# Patient Record
Sex: FEMALE | Race: WHITE | NOT HISPANIC OR LATINO | Employment: FULL TIME | URBAN - METROPOLITAN AREA
[De-identification: names, ages, dates, MRNs, and addresses within clinical notes are randomized per-mention and may not be internally consistent; named-entity substitution may affect disease eponyms.]

---

## 2018-08-17 ENCOUNTER — OFFICE VISIT CONVERTED (OUTPATIENT)
Dept: GASTROENTEROLOGY | Facility: CLINIC | Age: 33
End: 2018-08-17
Attending: PHYSICIAN ASSISTANT

## 2018-12-13 ENCOUNTER — OFFICE VISIT CONVERTED (OUTPATIENT)
Dept: GASTROENTEROLOGY | Facility: CLINIC | Age: 33
End: 2018-12-13
Attending: INTERNAL MEDICINE

## 2019-02-18 ENCOUNTER — CONVERSION ENCOUNTER (OUTPATIENT)
Dept: FAMILY MEDICINE CLINIC | Facility: CLINIC | Age: 34
End: 2019-02-18

## 2019-02-18 ENCOUNTER — OFFICE VISIT CONVERTED (OUTPATIENT)
Dept: FAMILY MEDICINE CLINIC | Facility: CLINIC | Age: 34
End: 2019-02-18
Attending: FAMILY MEDICINE

## 2019-02-18 ENCOUNTER — HOSPITAL ENCOUNTER (OUTPATIENT)
Dept: FAMILY MEDICINE CLINIC | Facility: CLINIC | Age: 34
Discharge: HOME | End: 2019-02-18
Attending: FAMILY MEDICINE

## 2019-02-18 LAB — TSH SERPL-ACNC: 1.01 M[IU]/L (ref 0.27–4.2)

## 2019-04-12 ENCOUNTER — HOSPITAL ENCOUNTER (OUTPATIENT)
Dept: OTHER | Facility: HOSPITAL | Age: 34
Discharge: HOME | End: 2019-04-12

## 2019-04-15 ENCOUNTER — OFFICE VISIT CONVERTED (OUTPATIENT)
Dept: UROLOGY | Facility: CLINIC | Age: 34
End: 2019-04-15
Attending: UROLOGY

## 2019-04-15 ENCOUNTER — CONVERSION ENCOUNTER (OUTPATIENT)
Dept: SURGERY | Facility: CLINIC | Age: 34
End: 2019-04-15

## 2019-08-06 ENCOUNTER — HOSPITAL ENCOUNTER (OUTPATIENT)
Dept: SURGERY | Facility: CLINIC | Age: 34
Discharge: HOME | End: 2019-08-06
Attending: PHYSICIAN ASSISTANT

## 2019-08-06 ENCOUNTER — OFFICE VISIT CONVERTED (OUTPATIENT)
Dept: SURGERY | Facility: CLINIC | Age: 34
End: 2019-08-06
Attending: PHYSICIAN ASSISTANT

## 2019-08-08 LAB — BACTERIA UR CULT: NORMAL

## 2019-08-23 ENCOUNTER — HOSPITAL ENCOUNTER (OUTPATIENT)
Dept: GENERAL RADIOLOGY | Facility: HOSPITAL | Age: 34
Discharge: HOME | End: 2019-08-23
Attending: OBSTETRICS & GYNECOLOGY

## 2020-09-14 ENCOUNTER — HOSPITAL ENCOUNTER (OUTPATIENT)
Dept: LAB | Facility: HOSPITAL | Age: 35
Discharge: HOME | End: 2020-09-14
Attending: INTERNAL MEDICINE

## 2020-09-15 LAB
CREAT 24H UR-MCNC: 73.9 MG/DL
CREAT CL 24H UR+SERPL-VRATE: 79 ML/MIN (ref 61–166)
CREAT UR-MCNC: 0.86 MG/DL (ref 0.5–0.9)
CREAT UR-MCNC: 0.96 MG/DL (ref 0.5–0.9)
PROT 24H UR-MRATE: 4.6 MG/DL (ref 0–12)
PROT 24H UR-MRATE: 57.5 MG/(24.H) (ref 42–225)
SPECIMEN VOL 24H UR: 1250 ML
SPECIMEN VOL 24H UR: 1250 ML

## 2020-09-17 LAB
CREAT 24H UR-MCNC: 0.3 MG/DL
CREAT 24H UR-MRATE: 4 MG/24 HR (ref 0–80)

## 2020-10-14 ENCOUNTER — CONVERSION ENCOUNTER (OUTPATIENT)
Dept: SURGERY | Facility: CLINIC | Age: 35
End: 2020-10-14

## 2020-10-14 ENCOUNTER — OFFICE VISIT CONVERTED (OUTPATIENT)
Dept: UROLOGY | Facility: CLINIC | Age: 35
End: 2020-10-14
Attending: UROLOGY

## 2021-05-13 NOTE — PROGRESS NOTES
Progress Note      Patient Name: Roula Riggs   Patient ID: [id number]   Sex: Female   YOB: 1985    Primary Care Provider: Donny Davidson III MD    Visit Date: October 14, 2020    Provider: Sandy Reno MD   Location: [facility]   Location Address: [address] [place] [address]  [id number]   Location Phone: [phone]          History Of Present Illness  The patient returns for a routine follow-up after a previous visit on 08/06/2019, for the treatment of symptoms consistent with cystitis after sexual activity   The patient also complains of pain. The pain is maximal in the suprapubic region and is described as aching and pressure-like in quality. It does not radiate. The aggravating factors are sexual activity when she is not able to urinate after intercourse and the alleviating factors are none reported.   She has not been evaluated for her current complaints. The patient has a history of recurrent urinary tract infections. She has had one or two infections since she saw us last year.      She typically has four to six infections a year and almost all are after she is unable to urinate after intercourse.    Per patient if she doesn't take the medication every time after intercourse she will be with symptoms of urgency, bladder spasms, difficulty voiding and pain in suprapubic region that last for a few days.  She was worried about taking the medication every time.  But if she doesn't the symptoms are pretty significant and bothersome.       The Macrobid has helped and she is happy with it.        She is considering donating a kidney to a friend.       Past Medical History  Migraine Headaches         Past Surgical History  Chemodenervation, for migraine; EGD         Medication List  Aimovig Autoinjector 70 mg/mL subcutaneous auto-injector; Imitrex 6 mg/0.5 mL subcutaneous solution; Macrobid 100 mg oral capsule; Miralax 17 gram/dose oral powder; Zomig 2.5 mg nasal  "spray,non-aerosol         Allergy List  NO KNOWN DRUG ALLERGIES       Allergies Reconciled  Family Medical History  - No Family History of Colorectal Cancer         Reproductive History   0 Para 0 0 0 0       Social History  Alcohol (Never); Tobacco (Never)         Immunizations  Name Date Admin   Hepatitis A 2019         Review of Systems  · Constitutional  o Denies  o : chills, fever  · Gastrointestinal  o Denies  o : nausea, vomiting      Vitals  Date Time BP Position Site L\R Cuff Size HR RR TEMP (F) WT  HT  BMI kg/m2 BSA m2 O2 Sat FR L/min FiO2        10/14/2020 04:30 /60 Sitting       106lbs 2oz 5'  3\" 18.8 1.46             Physical Examination  · Constitutional  o Appearance  o : Well nourished, well developed patient in no acute distress. Ambulating without difficulty.  · Respiratory  o Respiratory Effort  o : Breathing is unlabored without accessory muscle use  · Gastrointestinal  o Abdominal Examination  o : Scaphoid abdomen which is non-tender to palpation with normal tone and without rigidity or guarding. Normal bowel sounds. No masses present.  · Skin and Subcutaneous Tissue  o General Inspection  o : No rashes, lesions or areas of discoloration present. Skin turgor is normal.  o General Palpation  o : No abnormalities, masses or tenderness on palpation.          Results  · In-Office Procedures  o Lab procedure  § Automated dipstick urinalysis with microscopy ([id number])   § Color Ur: Yellow   § Clarity Ur: Clear   § Glucose Ur Ql Strip: Negative   § Bilirub Ur Ql Strip: Negative   § Ketones Ur Ql Strip: Negative   § Sp Gr Ur Qn: 1.020   § Hgb Ur Ql Strip: Trace-intact   § pH Ur-LsCnc: 5.5   § Prot Ur Ql Strip: Negative   § Urobilinogen Ur Strip-mCnc: 0.2   § Nitrite Ur Ql Strip: Negative   § WBC Est Ur Ql Strip: Negative   § RBC UrnS Qn HPF: 0   § WBC UrnS Qn HPF: 0   § Bacteria UrnS Qn HPF: 0   § Crystals UrnS Qn HPF: 0   § Epithelial Cells (non renal): 0 /HPF  § Epithelial Cells " (renal): 0       Assessment  · Recurrent UTI (urinary tract infection)     599.0/N39.0  · Cystitis     595.9/N30.90      Plan  · Orders  o Urine Culture (Clean Catch) [facility] ([id number], [id number]) - 595.9/N30.90 - 10/14/2020  · Medications  o Macrobid 100 mg oral capsule   SIG: take 1 capsule (100 mg) by oral route prn as directed   DISP: (30) Capsule with 11 refills  Refilled on 10/14/2020     o Medications have been Reconciled  o Transition of Care or Provider Policy  · Instructions  o Macrobid was refilled. She can use when with sexual activity. IF she feels symptoms are getting worse despite taking medication, I have recommended to contact office for a urine culture. Patient will return as needed and one year.   o Electronically Identified Patient Education Materials Provided Electronically            Electronically Signed by: Sandy Reno MD -Author on October 14, 2020 05:07:29 PM

## 2021-05-14 VITALS
WEIGHT: 106.12 LBS | HEIGHT: 63 IN | SYSTOLIC BLOOD PRESSURE: 105 MMHG | BODY MASS INDEX: 18.8 KG/M2 | DIASTOLIC BLOOD PRESSURE: 60 MMHG

## 2021-05-15 VITALS — HEIGHT: 63 IN | BODY MASS INDEX: 18.8 KG/M2 | HEART RATE: 73 BPM | WEIGHT: 106.12 LBS

## 2021-05-15 VITALS — WEIGHT: 103 LBS | RESPIRATION RATE: 15 BRPM | BODY MASS INDEX: 18.25 KG/M2 | HEIGHT: 63 IN

## 2021-05-16 VITALS
HEART RATE: 116 BPM | HEIGHT: 62 IN | BODY MASS INDEX: 18.95 KG/M2 | TEMPERATURE: 98.9 F | SYSTOLIC BLOOD PRESSURE: 98 MMHG | DIASTOLIC BLOOD PRESSURE: 60 MMHG | WEIGHT: 103 LBS | OXYGEN SATURATION: 99 %

## 2021-05-16 VITALS
BODY MASS INDEX: 19.57 KG/M2 | WEIGHT: 106.37 LBS | DIASTOLIC BLOOD PRESSURE: 83 MMHG | RESPIRATION RATE: 12 BRPM | HEART RATE: 106 BPM | SYSTOLIC BLOOD PRESSURE: 116 MMHG | HEIGHT: 62 IN | OXYGEN SATURATION: 100 %

## 2021-05-16 VITALS
HEIGHT: 62 IN | RESPIRATION RATE: 12 BRPM | DIASTOLIC BLOOD PRESSURE: 67 MMHG | BODY MASS INDEX: 20.46 KG/M2 | HEART RATE: 90 BPM | OXYGEN SATURATION: 100 % | SYSTOLIC BLOOD PRESSURE: 110 MMHG | WEIGHT: 111.19 LBS

## 2021-12-21 ENCOUNTER — LAB (OUTPATIENT)
Dept: LAB | Facility: HOSPITAL | Age: 36
End: 2021-12-21

## 2021-12-21 ENCOUNTER — TRANSCRIBE ORDERS (OUTPATIENT)
Dept: LAB | Facility: HOSPITAL | Age: 36
End: 2021-12-21

## 2021-12-21 DIAGNOSIS — Z52.4 KIDNEY DONOR: ICD-10-CM

## 2021-12-21 DIAGNOSIS — Z52.4 KIDNEY DONOR: Primary | ICD-10-CM

## 2021-12-21 LAB
BACTERIA UR QL AUTO: ABNORMAL /HPF
BILIRUB UR QL STRIP: NEGATIVE
CLARITY UR: CLEAR
COLOR UR: YELLOW
CREAT SERPL-MCNC: 1.13 MG/DL (ref 0.57–1)
GFR SERPL CREATININE-BSD FRML MDRD: 55 ML/MIN/1.73
GLUCOSE UR STRIP-MCNC: NEGATIVE MG/DL
HGB UR QL STRIP.AUTO: ABNORMAL
HYALINE CASTS UR QL AUTO: ABNORMAL /LPF
KETONES UR QL STRIP: ABNORMAL
LEUKOCYTE ESTERASE UR QL STRIP.AUTO: NEGATIVE
NITRITE UR QL STRIP: NEGATIVE
PH UR STRIP.AUTO: 5.5 [PH] (ref 5–8)
PROT UR QL STRIP: NEGATIVE
RBC # UR STRIP: ABNORMAL /HPF
REF LAB TEST METHOD: ABNORMAL
SP GR UR STRIP: 1.03 (ref 1–1.03)
SQUAMOUS #/AREA URNS HPF: ABNORMAL /HPF
UROBILINOGEN UR QL STRIP: ABNORMAL
WBC # UR STRIP: ABNORMAL /HPF

## 2021-12-21 PROCEDURE — 36415 COLL VENOUS BLD VENIPUNCTURE: CPT

## 2021-12-21 PROCEDURE — 82565 ASSAY OF CREATININE: CPT

## 2021-12-21 PROCEDURE — 81001 URINALYSIS AUTO W/SCOPE: CPT

## 2022-01-07 DIAGNOSIS — N30.00 ACUTE CYSTITIS WITHOUT HEMATURIA: Primary | ICD-10-CM

## 2022-01-11 RX ORDER — NITROFURANTOIN 25; 75 MG/1; MG/1
CAPSULE ORAL
Qty: 14 CAPSULE | Refills: 11 | Status: SHIPPED | OUTPATIENT
Start: 2022-01-11 | End: 2023-03-27 | Stop reason: SDUPTHER

## 2023-01-11 ENCOUNTER — TRANSCRIBE ORDERS (OUTPATIENT)
Dept: LAB | Facility: HOSPITAL | Age: 38
End: 2023-01-11
Payer: COMMERCIAL

## 2023-01-11 ENCOUNTER — LAB (OUTPATIENT)
Dept: LAB | Facility: HOSPITAL | Age: 38
End: 2023-01-11
Payer: COMMERCIAL

## 2023-01-11 DIAGNOSIS — Z52.4 KIDNEY DONOR: Primary | ICD-10-CM

## 2023-01-11 DIAGNOSIS — Z52.4 KIDNEY DONOR: ICD-10-CM

## 2023-01-11 LAB
ANION GAP SERPL CALCULATED.3IONS-SCNC: 11 MMOL/L (ref 5–15)
BILIRUB UR QL STRIP: NEGATIVE
BUN SERPL-MCNC: 12 MG/DL (ref 6–20)
BUN/CREAT SERPL: 10.7 (ref 7–25)
CALCIUM SPEC-SCNC: 9.9 MG/DL (ref 8.6–10.5)
CHLORIDE SERPL-SCNC: 101 MMOL/L (ref 98–107)
CLARITY UR: CLEAR
CO2 SERPL-SCNC: 24 MMOL/L (ref 22–29)
COLOR UR: YELLOW
CREAT SERPL-MCNC: 1.12 MG/DL (ref 0.57–1)
EGFRCR SERPLBLD CKD-EPI 2021: 65.5 ML/MIN/1.73
GLUCOSE SERPL-MCNC: 92 MG/DL (ref 65–99)
GLUCOSE UR STRIP-MCNC: NEGATIVE MG/DL
HGB UR QL STRIP.AUTO: NEGATIVE
KETONES UR QL STRIP: NEGATIVE
LEUKOCYTE ESTERASE UR QL STRIP.AUTO: NEGATIVE
NITRITE UR QL STRIP: NEGATIVE
PH UR STRIP.AUTO: 6.5 [PH] (ref 5–8)
POTASSIUM SERPL-SCNC: 3.8 MMOL/L (ref 3.5–5.2)
PROT UR QL STRIP: NEGATIVE
SODIUM SERPL-SCNC: 136 MMOL/L (ref 136–145)
SP GR UR STRIP: <=1.005 (ref 1–1.03)
UROBILINOGEN UR QL STRIP: NORMAL

## 2023-01-11 PROCEDURE — 80048 BASIC METABOLIC PNL TOTAL CA: CPT

## 2023-01-11 PROCEDURE — 36415 COLL VENOUS BLD VENIPUNCTURE: CPT

## 2023-01-11 PROCEDURE — 81003 URINALYSIS AUTO W/O SCOPE: CPT

## 2023-03-27 DIAGNOSIS — N30.00 ACUTE CYSTITIS WITHOUT HEMATURIA: ICD-10-CM

## 2023-03-27 RX ORDER — NITROFURANTOIN 25; 75 MG/1; MG/1
CAPSULE ORAL
Qty: 14 CAPSULE | Refills: 3 | Status: SHIPPED | OUTPATIENT
Start: 2023-03-27

## 2023-06-09 ENCOUNTER — OFFICE VISIT (OUTPATIENT)
Dept: UROLOGY | Facility: CLINIC | Age: 38
End: 2023-06-09
Payer: MEDICAID

## 2023-06-09 VITALS — WEIGHT: 102 LBS | HEIGHT: 63 IN | BODY MASS INDEX: 18.07 KG/M2

## 2023-06-09 DIAGNOSIS — N30.00 ACUTE CYSTITIS WITHOUT HEMATURIA: Primary | ICD-10-CM

## 2023-06-09 LAB
BILIRUB BLD-MCNC: NEGATIVE MG/DL
CLARITY, POC: CLEAR
COLOR UR: YELLOW
EXPIRATION DATE: 724
GLUCOSE UR STRIP-MCNC: NEGATIVE MG/DL
KETONES UR QL: ABNORMAL
LEUKOCYTE EST, POC: NEGATIVE
Lab: ABNORMAL
NITRITE UR-MCNC: NEGATIVE MG/ML
PH UR: 6.5 [PH] (ref 5–8)
PROT UR STRIP-MCNC: NEGATIVE MG/DL
RBC # UR STRIP: ABNORMAL /UL
SP GR UR: 1.02 (ref 1–1.03)
UROBILINOGEN UR QL: ABNORMAL

## 2023-06-09 RX ORDER — NITROFURANTOIN 25; 75 MG/1; MG/1
CAPSULE ORAL
Qty: 14 CAPSULE | Refills: 3 | Status: SHIPPED | OUTPATIENT
Start: 2023-06-09

## 2023-06-09 NOTE — PROGRESS NOTES
"Chief Complaint  Acute Cystitis    Subjective          Roula Riggs presents to [facility]  History of Present Illness   The patient presents today for a follow-up.    The patient is doing well as long as she takes Macrobid after intercourse. She denies any infections over the last year or 2. The patient reports she has irritation. Her pH is not very acidic.    A review of systems was completed and positive findings are noted in the HPI.    Objective   Vital Signs:   Ht 160 cm (63\")   Wt 46.3 kg (102 lb)   BMI 18.07 kg/m²       Physical Exam  Vitals and nursing note reviewed.   Constitutional:       Appearance: Normal appearance. She is well-developed.   Pulmonary:      Effort: Pulmonary effort is normal.      Breath sounds: Normal air entry.   Neurological:      Mental Status: She is alert and oriented to person, place, and time.      Motor: Motor function is intact.   Psychiatric:         Mood and Affect: Mood normal.         Behavior: Behavior normal.        Result Review :   The following data was reviewed by: Sandy Reno MD on 06/09/2023:    Results for orders placed or performed in visit on 06/09/23   POC Urinalysis Dipstick, Automated    Specimen: Urine   Result Value Ref Range    Color Yellow Yellow, Straw, Dark Yellow, Camille    Clarity, UA Clear Clear    Specific Gravity  1.020 1.005 - 1.030    pH, Urine 6.5 5.0 - 8.0    Leukocytes Negative Negative    Nitrite, UA Negative Negative    Protein, POC Negative Negative mg/dL    Glucose, UA Negative Negative mg/dL    Ketones, UA Trace (A) Negative    Urobilinogen, UA 0.2 E.U./dL Normal, 0.2 E.U./dL    Bilirubin Negative Negative    Blood, UA Trace (A) Negative    Lot Number [id number]     Expiration Date 724               Assessment and Plan    Diagnoses and all orders for this visit:    1. Acute cystitis without hematuria (Primary)  -     POC Urinalysis Dipstick, Automated  -     nitrofurantoin, " macrocrystal-monohydrate, (MACROBID) 100 MG capsule; Take 1 capsule by mouth as needed as directed  Dispense: 14 capsule; Refill: 3    - Refilled her Macrobid to take after intercourse.  - We will see her back in 1 year.        Follow Up     We will see her back in 1 year.    Patient was given instructions and counseling regarding her condition or for health maintenance advice. Please see specific information pulled into the AVS if appropriate.     Transcribed from ambient dictation for Sandy Reno MD by Anya Goetz.  06/09/23   12:53 EDT    Patient or patient representative verbalized consent to the visit recording.  I have personally performed the services described in this document as transcribed by the above individual, and it is both accurate and complete.

## 2023-11-08 NOTE — PROGRESS NOTES
GYN Visit    CC: AUB    HPI:   38 y.o. Contraception or HRT: Contraception:  Vasectomy   Menses:   q 30 days, lasts 6-7 days, changes products q 2 hrs on heaviest days.   Pain:  Mild, OTC meds control discomfort        Pt is c/o 6 months of increasingly heavy menstrual flow      History: PMHx, Meds, Allergies, PSHx, Social Hx, and POBHx all reviewed and updated.  Physical Exam   PHYSICAL EXAM:  /69   Pulse 94   Wt 46.7 kg (103 lb)   LMP 2023 (Exact Date)   Breastfeeding No   BMI 18.25 kg/m²   General- NAD, alert and oriented, appropriate  Psych- Normal mood, good memory    Neck- No masses, no thyroid enlargement  CV- Regular rhythm, no murnurs  Resp- CTA to bases, no wheezes  Abdomen- Soft, non distended, non tender, no masses    ASSESSMENT AND PLAN:  Diagnoses and all orders for this visit:    1. Abnormal uterine bleeding (AUB) (Primary)  Assessment & Plan:  C/o 6 months of icreasingly heavy menses  Passing clots, heavy flow for 2-3 days, flooding and having accidents at work. Changing double protection q 2 hours  Prior to this menses have always been regular    Orders:  -     CBC (No Diff)  -     Prolactin  -     TSH  -     T4, Free  -     US Pelvis Transvaginal Non OB; Future    2. Perimenopause  Assessment & Plan:  Patient is complaining of increasing anxiety and mood swings, significant brain fog, some hot flashes  She states her mother went through menopause in her mid 40s  Counseled patient regarding perimenopause as a clinical diagnosis and how abnormal uterine bleeding can also be seen in perimenopause  Discussed medical management of perimenopause.  We will continue to evaluate AUB          Counseling:  Perimenopause        Follow Up:  Return for post ultrasound.          Becca Persaud MD  2023

## 2023-11-09 ENCOUNTER — OFFICE VISIT (OUTPATIENT)
Dept: OBSTETRICS AND GYNECOLOGY | Facility: CLINIC | Age: 38
End: 2023-11-09
Payer: COMMERCIAL

## 2023-11-09 VITALS
WEIGHT: 103 LBS | BODY MASS INDEX: 18.25 KG/M2 | HEART RATE: 94 BPM | DIASTOLIC BLOOD PRESSURE: 69 MMHG | SYSTOLIC BLOOD PRESSURE: 100 MMHG

## 2023-11-09 DIAGNOSIS — N95.1 PERIMENOPAUSE: ICD-10-CM

## 2023-11-09 DIAGNOSIS — N93.9 ABNORMAL UTERINE BLEEDING (AUB): Primary | ICD-10-CM

## 2023-11-09 LAB
DEPRECATED RDW RBC AUTO: 41.7 FL (ref 37–54)
ERYTHROCYTE [DISTWIDTH] IN BLOOD BY AUTOMATED COUNT: 11.8 % (ref 12.3–15.4)
HCT VFR BLD AUTO: 35.3 % (ref 34–46.6)
HGB BLD-MCNC: 11.6 G/DL (ref 12–15.9)
MCH RBC QN AUTO: 31.9 PG (ref 26.6–33)
MCHC RBC AUTO-ENTMCNC: 32.9 G/DL (ref 31.5–35.7)
MCV RBC AUTO: 97 FL (ref 79–97)
PLATELET # BLD AUTO: 255 10*3/MM3 (ref 140–450)
PMV BLD AUTO: 11 FL (ref 6–12)
PROLACTIN SERPL-MCNC: 11.3 NG/ML (ref 4.79–23.3)
RBC # BLD AUTO: 3.64 10*6/MM3 (ref 3.77–5.28)
T4 FREE SERPL-MCNC: 1.1 NG/DL (ref 0.93–1.7)
TSH SERPL DL<=0.05 MIU/L-ACNC: 1.01 UIU/ML (ref 0.27–4.2)
WBC NRBC COR # BLD: 5.44 10*3/MM3 (ref 3.4–10.8)

## 2023-11-09 PROCEDURE — 85027 COMPLETE CBC AUTOMATED: CPT | Performed by: OBSTETRICS & GYNECOLOGY

## 2023-11-09 PROCEDURE — 84439 ASSAY OF FREE THYROXINE: CPT | Performed by: OBSTETRICS & GYNECOLOGY

## 2023-11-09 PROCEDURE — 84146 ASSAY OF PROLACTIN: CPT | Performed by: OBSTETRICS & GYNECOLOGY

## 2023-11-09 PROCEDURE — 84443 ASSAY THYROID STIM HORMONE: CPT | Performed by: OBSTETRICS & GYNECOLOGY

## 2023-11-09 RX ORDER — ERENUMAB-AOOE 140 MG/ML
INJECTION, SOLUTION SUBCUTANEOUS
COMMUNITY

## 2023-11-09 RX ORDER — RIMEGEPANT SULFATE 75 MG/75MG
TABLET, ORALLY DISINTEGRATING ORAL
COMMUNITY

## 2023-11-09 NOTE — ASSESSMENT & PLAN NOTE
C/o 6 months of icreasingly heavy menses  Passing clots, heavy flow for 2-3 days, flooding and having accidents at work. Changing double protection q 2 hours  Prior to this menses have always been regular

## 2023-11-09 NOTE — ASSESSMENT & PLAN NOTE
Patient is complaining of increasing anxiety and mood swings, significant brain fog, some hot flashes  She states her mother went through menopause in her mid 40s  Counseled patient regarding perimenopause as a clinical diagnosis and how abnormal uterine bleeding can also be seen in perimenopause  Discussed medical management of perimenopause.  We will continue to evaluate AUB

## 2023-11-09 NOTE — PATIENT INSTRUCTIONS
Venipuncture Blood Specimen Collection  Venipuncture performed in left arm by Mary Leon with good hemostasis. Patient tolerated the procedure well without complications.   11/09/23   Mary Leon

## 2023-12-12 ENCOUNTER — OFFICE VISIT (OUTPATIENT)
Dept: OBSTETRICS AND GYNECOLOGY | Facility: CLINIC | Age: 38
End: 2023-12-12
Payer: COMMERCIAL

## 2023-12-12 VITALS
DIASTOLIC BLOOD PRESSURE: 64 MMHG | SYSTOLIC BLOOD PRESSURE: 97 MMHG | BODY MASS INDEX: 18.42 KG/M2 | HEART RATE: 75 BPM | WEIGHT: 104 LBS

## 2023-12-12 DIAGNOSIS — Z30.430 ENCOUNTER FOR IUD INSERTION: Primary | ICD-10-CM

## 2023-12-12 DIAGNOSIS — Z30.430 ENCOUNTER FOR IUD INSERTION: ICD-10-CM

## 2024-01-16 NOTE — PROGRESS NOTES
Well Woman Visit    CC: Scheduled annual well gyn visit  Chief Complaint   Patient presents with    Annual Exam     IUD check       Myriad intake in the past?: No        Contraception:  Mirena IUD, Contraception:  Vasectomy     HPI:   38 y.o.     Menses:   q irregular days, lasts irregular days, changes products q irregular hrs on heaviest days.     Pain:  Mild, OTC meds control discomfort    PCP: does manage PMHx and preventative labs  History: PMHx, Meds, Allergies, PSHx, Social Hx, and POBHx all reviewed and updated.    Patient is having significant side effects with her Mirena IUD.  She has significant bilateral breast tenderness as well as cystic acne since the IUD was placed.  She has a Mirena IUD several years prior and did not have any of the symptoms with that IUD.    PHYSICAL EXAM:  /69   Pulse 73   Wt 47.2 kg (104 lb)   Breastfeeding No   BMI 18.42 kg/m²  Not found.  General- NAD, alert and oriented, appropriate  Psych- Normal mood, good memory  Neck- No masses, no thyroid enlargement  CV- Regular rhythm, no murnurs  Resp- CTA to bases, no wheezes  Abdomen- Soft, non distended, non tender, no masses    Breast left-  Bilaterally symmetrical, no masses, non tender, no nipple discharge  Breast right- Bilaterally symmetrical, no masses, non tender, no nipple discharge    External genitalia- Normal female, no lesions  Urethra/meatus- Normal, no masses, non tender  Bladder- Normal, no masses, non tender  Vagina- Normal, no atrophy, no lesions, no discharge.    Cvx- Normal, no lesions, no discharge, No cervical motion tenderness, IUD strings visable 2cm, IUD strings gently grasped and IUD was removed with gentle traction, intact and discarded.  Uterus- Normal size, shape & consistency.  Non tender, mobile.  Adnexa- No mass, non tender  Anus/Rectum/Perineum- Not performed    Lymphatic- No palpable neck, axillary, or groin nodes  Ext- No edema, no cyanosis    Skin- No lesions, no rashes, no  acanthosis nigricans      ASSESSMENT and PLAN:    Diagnoses and all orders for this visit:    1. Abnormal uterine bleeding (AUB) (Primary)  Assessment & Plan:  Patient had a Mirena IUD placed and is continuing to have persistent AUB.  Patient also is complaining of significant bilateral breast tenderness and cystic acne since IUD was placed.  Patient used an IUD several years earlier and did not have any of the side effects at that time.  Patient does not need contraception and was only using the Mirena IUD for AUB.  Counseled the patient regarding removal of the IUD and proceeding with hysteroscopy/D&C/endometrial ablation.  Patient was counseled at length regarding the risks and benefits of the above surgery.  All questions were answered and informed consent was obtained.  IUD strings were easily visualized grasped and the Mirena IUD was removed with gentle traction intact and discarded.    Orders:  -     Case Request; Standing  -     CBC and Differential; Future  -     Urinalysis With Culture If Indicated -; Future  -     sodium chloride 0.9 % flush 3 mL  -     sodium chloride 0.9 % flush 10 mL  -     sodium chloride 0.9 % infusion 40 mL  -     lactated ringers infusion  -     acetaminophen (TYLENOL) tablet 1,000 mg  -     ceFAZolin (ANCEF) 2,000 mg in sodium chloride 0.9 % 100 mL IVPB  -     Case Request  -     Urinalysis With Culture If Indicated - Urine, Clean Catch  -     [place] Urine Culture Tube - Urine, Clean Catch    2. Well woman exam with routine gynecological exam  -     IgP, Aptima HPV    3. Perimenopause    Other orders  -     Code Status and Medical Interventions:; Standing  -     Follow Anesthesia Guidelines / Protocol; Future  -     Follow Anesthesia Guidelines / Protocol; Standing  -     Obtain Informed Consent; Standing  -     Verify / Perform Chlorhexidine Skin Prep; Standing  -     Insert Peripheral IV; Standing  -     Saline Lock & Maintain IV Access; Standing        Preventative:  CERVICAL  CANCER Screening- Reviewed current ASCCP guidelines for screening w and wo cotest HPV, age specific.  Screen: Updated today  Follow up PCP/Specialist PMHx and/or Labs    Pt was counseled at length regarding the risks and benefits of surgery.  The risks include but are not limited to the risk of anesthesia, the risk of bleeding, the risk of infection, the risk of injury to the bowel, bladder, ureters and any surrounding structures.  Risks also include possibility of needing future surgery to correct an issue as a result of the first surgery.  All questions were answered and informed consent was obtained.   She understands the importance of having any ordered tests to be performed in a timely fashion.  The risks of not performing them include, but are not limited to, advanced cancer stages, bone loss from osteoporosis and/or subsequent increase in morbidity and/or mortality.  She is encouraged to review her results online and/or contact or office if she has questions.     Follow Up:  Return for EMB.            Becca Persaud MD  01/17/2024    [facility]  [facility] [place] [address]  Dept: [phone]  Dept Fax: [phone]  Loc: [phone]  Loc Fax: [phone]

## 2024-01-17 ENCOUNTER — OFFICE VISIT (OUTPATIENT)
Dept: OBSTETRICS AND GYNECOLOGY | Facility: CLINIC | Age: 39
End: 2024-01-17
Payer: COMMERCIAL

## 2024-01-17 VITALS
WEIGHT: 104 LBS | BODY MASS INDEX: 18.42 KG/M2 | DIASTOLIC BLOOD PRESSURE: 69 MMHG | SYSTOLIC BLOOD PRESSURE: 101 MMHG | HEART RATE: 73 BPM

## 2024-01-17 DIAGNOSIS — Z01.419 WELL WOMAN EXAM WITH ROUTINE GYNECOLOGICAL EXAM: ICD-10-CM

## 2024-01-17 DIAGNOSIS — N95.1 PERIMENOPAUSE: ICD-10-CM

## 2024-01-17 DIAGNOSIS — N93.9 ABNORMAL UTERINE BLEEDING (AUB): Primary | ICD-10-CM

## 2024-01-17 LAB
BACTERIA UR QL AUTO: ABNORMAL /HPF
BILIRUB UR QL STRIP: NEGATIVE
CLARITY UR: CLEAR
COLOR UR: YELLOW
GLUCOSE UR STRIP-MCNC: NEGATIVE MG/DL
HGB UR QL STRIP.AUTO: NEGATIVE
HOLD SPECIMEN: NORMAL
HYALINE CASTS UR QL AUTO: ABNORMAL /LPF
KETONES UR QL STRIP: ABNORMAL
LEUKOCYTE ESTERASE UR QL STRIP.AUTO: NEGATIVE
NITRITE UR QL STRIP: NEGATIVE
PH UR STRIP.AUTO: 5.5 [PH] (ref 5–8)
PROT UR QL STRIP: ABNORMAL
RBC # UR STRIP: ABNORMAL /HPF
REF LAB TEST METHOD: ABNORMAL
SP GR UR STRIP: >=1.03 (ref 1–1.03)
SQUAMOUS #/AREA URNS HPF: ABNORMAL /HPF
UROBILINOGEN UR QL STRIP: ABNORMAL
WBC # UR STRIP: ABNORMAL /HPF

## 2024-01-17 PROCEDURE — 81001 URINALYSIS AUTO W/SCOPE: CPT | Performed by: OBSTETRICS & GYNECOLOGY

## 2024-01-17 RX ORDER — SODIUM CHLORIDE 0.9 % (FLUSH) 0.9 %
10 SYRINGE (ML) INJECTION AS NEEDED
OUTPATIENT
Start: 2024-01-17

## 2024-01-17 RX ORDER — SODIUM CHLORIDE, SODIUM LACTATE, POTASSIUM CHLORIDE, CALCIUM CHLORIDE 600; 310; 30; 20 MG/100ML; MG/100ML; MG/100ML; MG/100ML
125 INJECTION, SOLUTION INTRAVENOUS CONTINUOUS
OUTPATIENT
Start: 2024-01-17

## 2024-01-17 RX ORDER — SODIUM CHLORIDE 9 MG/ML
40 INJECTION, SOLUTION INTRAVENOUS AS NEEDED
OUTPATIENT
Start: 2024-01-17

## 2024-01-17 RX ORDER — SODIUM CHLORIDE 0.9 % (FLUSH) 0.9 %
3 SYRINGE (ML) INJECTION EVERY 12 HOURS SCHEDULED
OUTPATIENT
Start: 2024-01-17

## 2024-01-17 RX ORDER — ZOLMITRIPTAN 5 MG/1
1 SPRAY NASAL
COMMUNITY
Start: 2024-01-04

## 2024-01-17 RX ORDER — ACETAMINOPHEN 500 MG
1000 TABLET ORAL ONCE
OUTPATIENT
Start: 2024-01-17 | End: 2024-01-17

## 2024-01-17 NOTE — ASSESSMENT & PLAN NOTE
Patient had a Mirena IUD placed and is continuing to have persistent AUB.  Patient also is complaining of significant bilateral breast tenderness and cystic acne since IUD was placed.  Patient used an IUD several years earlier and did not have any of the side effects at that time.  Patient does not need contraception and was only using the Mirena IUD for AUB.  Counseled the patient regarding removal of the IUD and proceeding with hysteroscopy/D&C/endometrial ablation.  Patient was counseled at length regarding the risks and benefits of the above surgery.  All questions were answered and informed consent was obtained.  IUD strings were easily visualized grasped and the Mirena IUD was removed with gentle traction intact and discarded.

## 2024-01-22 LAB
CYTOLOGIST CVX/VAG CYTO: NORMAL
CYTOLOGY CVX/VAG DOC CYTO: NORMAL
CYTOLOGY CVX/VAG DOC THIN PREP: NORMAL
DX ICD CODE: NORMAL
HIV 1 & 2 AB SER-IMP: NORMAL
HPV I/H RISK 4 DNA CVX QL PROBE+SIG AMP: NEGATIVE
OTHER STN SPEC: NORMAL
STAT OF ADQ CVX/VAG CYTO-IMP: NORMAL

## 2024-03-25 ENCOUNTER — TELEPHONE (OUTPATIENT)
Dept: OBSTETRICS AND GYNECOLOGY | Facility: CLINIC | Age: 39
End: 2024-03-25
Payer: COMMERCIAL

## 2024-05-06 DIAGNOSIS — N30.00 ACUTE CYSTITIS WITHOUT HEMATURIA: ICD-10-CM

## 2024-05-06 RX ORDER — NITROFURANTOIN 25; 75 MG/1; MG/1
100 CAPSULE ORAL AS NEEDED
Qty: 14 CAPSULE | Refills: 0 | Status: SHIPPED | OUTPATIENT
Start: 2024-05-06

## 2024-07-15 DIAGNOSIS — N30.00 ACUTE CYSTITIS WITHOUT HEMATURIA: ICD-10-CM

## 2024-07-15 RX ORDER — NITROFURANTOIN 25; 75 MG/1; MG/1
100 CAPSULE ORAL AS NEEDED
Qty: 14 CAPSULE | Refills: 0 | Status: SHIPPED | OUTPATIENT
Start: 2024-07-15

## 2024-10-16 ENCOUNTER — OFFICE VISIT (OUTPATIENT)
Dept: UROLOGY | Facility: CLINIC | Age: 39
End: 2024-10-16
Payer: COMMERCIAL

## 2024-10-16 VITALS
HEIGHT: 63 IN | WEIGHT: 104 LBS | DIASTOLIC BLOOD PRESSURE: 69 MMHG | SYSTOLIC BLOOD PRESSURE: 113 MMHG | BODY MASS INDEX: 18.43 KG/M2

## 2024-10-16 DIAGNOSIS — N30.00 ACUTE CYSTITIS WITHOUT HEMATURIA: Primary | ICD-10-CM

## 2024-10-16 LAB
BILIRUB BLD-MCNC: NEGATIVE MG/DL
CLARITY, POC: CLEAR
COLOR UR: YELLOW
EXPIRATION DATE: 825
GLUCOSE UR STRIP-MCNC: NEGATIVE MG/DL
KETONES UR QL: NEGATIVE
LEUKOCYTE EST, POC: ABNORMAL
Lab: ABNORMAL
NITRITE UR-MCNC: NEGATIVE MG/ML
PH UR: 6.5 [PH] (ref 5–8)
PROT UR STRIP-MCNC: NEGATIVE MG/DL
RBC # UR STRIP: ABNORMAL /UL
SP GR UR: 1.02 (ref 1–1.03)
UROBILINOGEN UR QL: ABNORMAL

## 2024-10-16 NOTE — PROGRESS NOTES
"Chief Complaint  Acute cystitis without hematuria    Subjective          Roula Riggs presents to [facility]    History of Present Illness  The patient presents today for a follow-up.     The patient is doing well as long as she takes Macrobid after intercourse. She denies any infections over the last year or 2. The patient reports she has irritation. Her pH is not very acidic.     A review of systems was completed and positive findings are noted in the HPI.      Objective   Vital Signs:   /69   Ht 160 cm (63\")   Wt 47.2 kg (104 lb)   BMI 18.42 kg/m²       Physical Exam  Vitals and nursing note reviewed.   Constitutional:       Appearance: Normal appearance. She is well-developed.   Pulmonary:      Effort: Pulmonary effort is normal.      Breath sounds: Normal air entry.   Neurological:      Mental Status: She is alert and oriented to person, place, and time.      Motor: Motor function is intact.   Psychiatric:         Mood and Affect: Mood normal.         Behavior: Behavior normal.          Result Review :   The following data was reviewed by: Sandy Reno MD on 10/16/2024:    Results for orders placed or performed in visit on 10/16/24   POC Urinalysis Dipstick, Automated    Collection Time: 10/16/24  2:43 PM    Specimen: Urine   Result Value Ref Range    Color Yellow Yellow, Straw, Dark Yellow, Camille    Clarity, UA Clear Clear    Specific Gravity  1.020 1.005 - 1.030    pH, Urine 6.5 5.0 - 8.0    Leukocytes Trace (A) Negative    Nitrite, UA Negative Negative    Protein, POC Negative Negative mg/dL    Glucose, UA Negative Negative mg/dL    Ketones, UA Negative Negative    Urobilinogen, UA 0.2 E.U./dL Normal, 0.2 E.U./dL    Bilirubin Negative Negative    Blood, UA Large (A) Negative    Lot Number [id number]     Expiration Date 825               Assessment and Plan    Diagnoses and all orders for this visit:    1. Acute cystitis without hematuria (Primary)  -     POC " Urinalysis Dipstick, Automated  -     nitrofurantoin, macrocrystal-monohydrate, (MACROBID) 100 MG capsule; Take 1 capsule by mouth As Needed (as directed).  Dispense: 14 capsule; Refill: 0    She will continue Macrobid prophylaxis after intercourse.  I will see her in 1 year or sooner if needed.          Follow Up       No follow-ups on file.  Patient was given instructions and counseling regarding her condition or for health maintenance advice. Please see specific information pulled into the AVS if appropriate.

## 2024-10-18 ENCOUNTER — TELEPHONE (OUTPATIENT)
Dept: UROLOGY | Facility: CLINIC | Age: 39
End: 2024-10-18
Payer: COMMERCIAL

## 2024-10-18 RX ORDER — NITROFURANTOIN 25; 75 MG/1; MG/1
100 CAPSULE ORAL AS NEEDED
Qty: 14 CAPSULE | Refills: 0 | Status: SHIPPED | OUTPATIENT
Start: 2024-10-18

## 2024-10-18 NOTE — TELEPHONE ENCOUNTER
PATIENT SAID SHE WAS SEEN WEDNESDAY, AND WAS SUPPOSED TO HAVE AN PRESCRIPTION FOR MACROBID SENT TO [facility], BUT IT WASN'T SENT.

## 2025-01-20 ENCOUNTER — TELEPHONE (OUTPATIENT)
Dept: OBSTETRICS AND GYNECOLOGY | Facility: CLINIC | Age: 40
End: 2025-01-20

## 2025-01-20 NOTE — TELEPHONE ENCOUNTER
Caller: Roula Riggs    Relationship:  Self    Best call back number: [phone]    PATIENT CALLED REQUESTING TO CANCEL SAME DAY APPT.    Did the patient call AFTER the start time of their scheduled appointment?  []YES  [x]NO    Was the patient's appointment rescheduled? [x]YES  []NO    Any additional information: PT STARTED HER CYCLE AND HAD TO RESCHEDULE TODAY'S APPT    .

## 2025-02-05 ENCOUNTER — OFFICE VISIT (OUTPATIENT)
Dept: OBSTETRICS AND GYNECOLOGY | Facility: CLINIC | Age: 40
End: 2025-02-05
Payer: COMMERCIAL

## 2025-02-05 ENCOUNTER — TELEPHONE (OUTPATIENT)
Dept: UROLOGY | Age: 40
End: 2025-02-05
Payer: COMMERCIAL

## 2025-02-05 VITALS
HEIGHT: 63 IN | HEART RATE: 96 BPM | SYSTOLIC BLOOD PRESSURE: 116 MMHG | BODY MASS INDEX: 18.43 KG/M2 | WEIGHT: 104 LBS | DIASTOLIC BLOOD PRESSURE: 75 MMHG

## 2025-02-05 DIAGNOSIS — Z01.411 ENCOUNTER FOR GYNECOLOGICAL EXAMINATION WITH ABNORMAL FINDING: Primary | ICD-10-CM

## 2025-02-05 DIAGNOSIS — N30.00 ACUTE CYSTITIS WITHOUT HEMATURIA: ICD-10-CM

## 2025-02-05 DIAGNOSIS — R10.2 PELVIC PRESSURE IN FEMALE: ICD-10-CM

## 2025-02-05 DIAGNOSIS — R35.0 URINARY FREQUENCY: ICD-10-CM

## 2025-02-05 LAB
BILIRUB BLD-MCNC: NEGATIVE MG/DL
GLUCOSE UR STRIP-MCNC: NEGATIVE MG/DL
KETONES UR QL: NEGATIVE
LEUKOCYTE EST, POC: NEGATIVE
NITRITE UR-MCNC: NEGATIVE MG/ML
PH UR: 6 [PH] (ref 5–8)
PROT UR STRIP-MCNC: NEGATIVE MG/DL
RBC # UR STRIP: NEGATIVE /UL
SP GR UR: 1.02 (ref 1–1.03)
UROBILINOGEN UR QL: NORMAL

## 2025-02-05 PROCEDURE — G0123 SCREEN CERV/VAG THIN LAYER: HCPCS | Performed by: OBSTETRICS & GYNECOLOGY

## 2025-02-05 RX ORDER — NITROFURANTOIN 25; 75 MG/1; MG/1
100 CAPSULE ORAL AS NEEDED
Qty: 14 CAPSULE | Refills: 2 | Status: SHIPPED | OUTPATIENT
Start: 2025-02-05

## 2025-02-05 NOTE — TELEPHONE ENCOUNTER
PATIENT CALLED AND SAID SHE WAS SEEN ON 10/16/25.  SHE SAID DR. RICARDO PRESCRIBES MACROBID, AND USUALLY PUTS REFILLS ON IT.  AT THE LAST VISIT, THERE WERE NO REFILLS, SO SHE HAS BEEN CALLING IN EVERY MONTH REQUESTING REFILLS.    SHE SAID [facility] HAS REQUESTED REFILLS TWICE, BUT HAVE NOT RECEIVED RESPONSE.    #[phone]

## 2025-02-05 NOTE — PROGRESS NOTES
"Well Woman Visit    CC: Annual well woman exam       HPI:   39 y.o. Contraception or HRT: Contraception:  Vasectomy   Menses:   q mon, lasts 7 days, changes products q 3hrs on heaviest days.   Pain:  Moderate, not too bad  Incontinence concerns: No  Hx of abnormal pap:  No  Pt c/o urinary frequency and pressure which started today. Hx of uti. Denies dysuria.       History: PMHx, Meds, Allergies, PSHx, Social Hx, and POBHx all reviewed and updated.      PHYSICAL EXAM:  /75   Pulse 96   Ht 160 cm (63\")   Wt 47.2 kg (104 lb)   BMI 18.42 kg/m²   General- NAD, alert and oriented, appropriate  Psych- Normal mood, good memory  Neck- No masses, no thyroid enlargement  CV- Regular rhythm, no murnurs  Resp- CTA to bases, no wheezes  Abdomen- Soft, non distended, non tender, no masses    Breast left-  Bilaterally symmetrical, no masses, non tender, no nipple discharge  Breast right- Bilaterally symmetrical, no masses, non tender, no nipple discharge    External genitalia- Normal female, no lesions  Urethra/meatus- Normal, no masses, non tender, no prolapse  Bladder- Normal, no masses, non tender, no prolapse  Vagina- Normal, no atrophy, no lesions, no discharge, no prolapse  Cvx- Normal, no lesions, no discharge, No cervical motion tenderness  Uterus- Normal size, shape & consistency.  Non tender, mobile.  Adnexa- No mass, non tender  Anus/Rectum/Perineum- Not performed    Lymphatic- No palpable neck, axillary, or groin nodes  Ext- No edema, no cyanosis    Skin- No lesions, no rashes, no acanthosis nigricans        ASSESSMENT and PLAN:  WWE and problem visit    Diagnoses and all orders for this visit:    1. Encounter for gynecological examination with abnormal finding (Primary)  -     IGP,rfx Aptima HPV All Pth    2. Urinary frequency    3. Pelvic pressure in female    Desires to continue with yearly paps for now. Urine multistick negative today. Increase fluids. F/u with pcp/urology.     Counseling:     Track " menses, RTO IF <q21d, >7d long, or heavy    Domestic violence/abuse screen: negative    Depression screen: no SI    Preventative:   BREAST HEALTH- Monthly self breast exam importance and how to reviewed. MMG and/or MRI (prn) reviewed per society guidelines and her individual history. Mammo/MRI screen: Not medically needed.  CERVICAL CANCER Screening- Reviewed current ASCCP guidelines for screening w and wo cotest HPV, age specific.  Screen: Updated today.  COLON CANCER Screening- Reviewed current medical society guidelines and options.  Colonoscopy screen:  Not medically needed.  SEXUAL HEALTH: Declines STD screening.  VACCINATIONS Recommended: Flu annually, Gardisil/HPV vaccine 9-27yo, up to 46yo.  Importance discussed, risk being unvaccinated reviewed.  Questions answered  Smoking status- NON SMOKER.  Importance of avoiding second hand smoke.  Myriad : Counseled and evaluated for hereditary cancer testing Provided with QR code, email and phone number to schedule counseling to determine if she qualifies.  I recommend she call our office if she has further questions  Gardasil status: declined.      She understands the importance of having any ordered tests to be performed in a timely fashion.  She is encouraged to review her results online and/or contact or office if she has questions.     Follow Up:  Return in about 1 year (around 2/5/2026) for Annual physical.      Nimo Gutiérrez, APRN  02/05/2025

## 2025-02-08 LAB
CONV .: NORMAL
CYTOLOGIST CVX/VAG CYTO: NORMAL
CYTOLOGY CVX/VAG DOC CYTO: NORMAL
CYTOLOGY CVX/VAG DOC THIN PREP: NORMAL
DX ICD CODE: NORMAL
OTHER STN SPEC: NORMAL
SERVICE CMNT-IMP: NORMAL
STAT OF ADQ CVX/VAG CYTO-IMP: NORMAL

## 2025-07-01 DIAGNOSIS — N30.00 ACUTE CYSTITIS WITHOUT HEMATURIA: ICD-10-CM

## 2025-07-01 RX ORDER — NITROFURANTOIN 25; 75 MG/1; MG/1
100 CAPSULE ORAL AS NEEDED
Qty: 14 CAPSULE | Refills: 2 | Status: SHIPPED | OUTPATIENT
Start: 2025-07-01

## 2025-07-01 NOTE — TELEPHONE ENCOUNTER
Caller: Roula Riggs    Relationship: Self    Best call back number:   Telephone Information:   Mobile [phone]         Requested Prescriptions:   Requested Prescriptions     Pending Prescriptions Disp Refills    nitrofurantoin, macrocrystal-monohydrate, (MACROBID) 100 MG capsule 14 capsule 2     Sig: Take 1 capsule by mouth As Needed (as directed).        Pharmacy where request should be sent:      Last office visit with prescribing clinician: 10/16/2024   Last telemedicine visit with prescribing clinician: Visit date not found   Next office visit with prescribing clinician: 12/8/2025     Additional details provided by patient: PATIENT CALLED TO SCHEDULE YEARLY FOLLOW UP WITH DR RICARDO.  1ST AVAIL NOT UNTIL DEC 08, 2025.  PATIENT NEEDS REFILLS UNTIL SHE IS ABLE TO BE SEEN IN DECEMBER.  PLEASE CALL TO ADVISE.      Does the patient have less than a 3 day supply:  [x] Yes  [] No    Would you like a call back once the refill request has been completed: [x] Yes [] No    If the office needs to give you a call back, can they leave a voicemail: [x] Yes [] No    Benny Dang Rep   07/01/25 10:06 EDT